# Patient Record
Sex: MALE | Race: WHITE
[De-identification: names, ages, dates, MRNs, and addresses within clinical notes are randomized per-mention and may not be internally consistent; named-entity substitution may affect disease eponyms.]

---

## 2017-09-14 NOTE — EDPHY
H & P


Stated Complaint: Neck pain


Time Seen by Provider: 09/14/17 21:37


HPI/ROS: 


HPI:  This is a 42-year-old male who presents with





Chief Complaint:  Neck pain





Location:  Posterior neck


Quality:  Pain


Duration:  1 hour prior to arrival


Signs and Symptoms:  No bleeding, + radiation down into left hand, no numbness, 

no weakness, no tingling, no incontinence, no decreased range of motion


Timing:  Sudden


Severity:  Moderate


Context:  Patient is an employee of [employer].  He was 

assisting with transfer of a patient.  The patient lost her balance and the 

majority of the patient's weight moved on to him.  He strained to stabilizer 

and prevented the patient from falling.  He started to experience posterior 

cervical pain and radiation into the left arm.  He took 2 Advils with mild-to-

moderate relief. 


Modifying Factors:  Advil





Comment: 








ROS:


Constitutional: No fever, no chills, no weight loss


Eyes:  No blurred vision


Respiratory:  No shortness of breath, no cough


Cardiovascular:  No chest pain


Gastrointestinal:  No nausea, no vomiting no diarrhea 


Genitourinary:  No dysuria 


Extremities:  No myalgias 


Neurologic:  No weakness, no numbness


Skin:  No rashes


Hematologic:  No bruising, no bleeding





MEDICAL/SURGICAL/SOCIAL HISTORY: 


Generally healthy. 








Source: Patient


Exam Limitations: No limitations





- Physical Exam


Exam: 


CONSTITUTIONAL:  Pleasant adult white male, awake and alert, no obvious distress


HEENT: Atraumatic and normocephalic, PERRL, EOMI. Oropharynx clear and moist 

pink mucosa. Airway patent.  No lymphadenopathy. 


NECK: supple, mild C6 and C7 midline tenderness, no deformity; step-off. 

flexion 45 degrees, extension 45 degrees, right and left lateral flexion 45 

degrees. No meningismus.


Cardiovascular: Normal S1/S2, regular rate, regular rhythm, without murmur rub 

or gallop.


PULMONARY/CHEST:  Symmetrical and nontender. no crepitus. Clear to auscultation 

bilaterally.


ABDOMEN:  Soft, nondistended, nontender.


EXTREMITIES:  2/2 pulses, no deformities, no clubbing, no cyanosis or edema.  

Left shoulder full range of motion.  Left elbow full range of motion. 


NEUROLOGICAL: no focal neuro deficits.  GCS 15. Light touch sensation intact. 


SKIN: Warm and dry, no erythema. no rash.  Good capillary refill. 





Constitutional: 


 Initial Vital Signs











Temperature (C)  36.6 C   09/14/17 22:03


 


Heart Rate  66   09/14/17 22:03


 


Respiratory Rate  16   09/14/17 22:03


 


Blood Pressure  134/74 H  09/14/17 22:03


 


O2 Sat (%)  96   09/14/17 22:03








 











O2 Delivery Mode               Room Air














Allergies/Adverse Reactions: 


 





No Known Allergies Allergy (Unverified 09/14/17 22:03)


 








Home Medications: 














 Medication  Instructions  Recorded


 


Cyclobenzaprine [Flexeril 10 MG 10 mg PO TID PRN #15 tab 09/14/17





(*)]  














Medical Decision Making





- Diagnostics


Imaging Results: 


 Imaging Impressions





Cervical Spine X-Ray  09/14/17 21:36


Impression:


1.  No acute osseous abnormality.


2.  Mild degenerative changes at C5-C6 and C6-C7.


3.  Rim calcified structure in the low neck on the right is indeterminate but 

may represent vascular calcifications or a lymph node.











ED Course/Re-evaluation: 


Cervical x-ray and p.o. Flexeril ordered


No signs of neurovascular compromise/tenting of skin/compartment syndrome/

extremities and joints examined above and below area of concern and are 

neurovascularly intact. 





Differential Diagnosis: 


Differential diagnosis includes but is not limited to cervical paraspinous 

muscle spasm, cervical sprain, cervical radiculopathy. 








- Data Points


Medications Given: 


 








Discontinued Medications





Cyclobenzaprine HCl (Flexeril)  10 mg PO EDNOW ONE


   Stop: 09/14/17 21:37


   Last Admin: 09/14/17 22:01 Dose:  10 mg








Departure





- Departure


Disposition: Home, Routine, Self-Care


Clinical Impression: 


 Disc disease, degenerative, cervical





Cervical muscle strain


Qualifiers:


 Encounter type: initial encounter Qualified Code(s): S16.1XXA - Strain of 

muscle, fascia and tendon at neck level, initial encounter





Condition: Good


Instructions:  Cervical Strain (ED)


Additional Instructions: 


Take ibuprofen 600-800 mg every 6-8 hours with food as needed for pain and 

inflammation. 


Take Flexeril 3 times a day as needed for muscle spasm. 


Follow up with Neurosurgery in 7-10 days if symptoms worsen or persist.  





X-rays today show mild degenerate changes at C5-C6 to C6-C7. 





Referrals: 


Steffen Hernandez MD [Medical Doctor] - Follow Up Only If Needed


Prescriptions: 


Cyclobenzaprine [Flexeril 10 MG (*)] 10 mg PO TID PRN #15 tab


 PRN Reason: Spasms

## 2018-11-13 ENCOUNTER — HOSPITAL ENCOUNTER (OUTPATIENT)
Dept: HOSPITAL 80 - CIMAGING | Age: 43
End: 2018-11-13
Attending: PHYSICAL MEDICINE & REHABILITATION
Payer: COMMERCIAL

## 2018-11-13 DIAGNOSIS — R07.81: Primary | ICD-10-CM
